# Patient Record
Sex: FEMALE | Race: WHITE | NOT HISPANIC OR LATINO | URBAN - METROPOLITAN AREA
[De-identification: names, ages, dates, MRNs, and addresses within clinical notes are randomized per-mention and may not be internally consistent; named-entity substitution may affect disease eponyms.]

---

## 2017-01-18 ENCOUNTER — COMMUNICATION - HEALTHEAST (OUTPATIENT)
Dept: TELEHEALTH | Facility: CLINIC | Age: 47
End: 2017-01-18

## 2017-01-18 ENCOUNTER — OFFICE VISIT - HEALTHEAST (OUTPATIENT)
Dept: FAMILY MEDICINE | Facility: CLINIC | Age: 47
End: 2017-01-18

## 2017-01-18 DIAGNOSIS — Z13.1 SCREENING FOR DIABETES MELLITUS: ICD-10-CM

## 2017-01-18 DIAGNOSIS — Z13.228 SCREENING FOR ENDOCRINE, NUTRITIONAL, METABOLIC AND IMMUNITY DISORDER: ICD-10-CM

## 2017-01-18 DIAGNOSIS — Z13.228 SCREENING FOR METABOLIC DISORDER: ICD-10-CM

## 2017-01-18 DIAGNOSIS — Z13.29 SCREENING FOR THYROID DISORDER: ICD-10-CM

## 2017-01-18 DIAGNOSIS — Z13.29 SCREENING FOR ENDOCRINE, NUTRITIONAL, METABOLIC AND IMMUNITY DISORDER: ICD-10-CM

## 2017-01-18 DIAGNOSIS — Z13.220 SCREENING, LIPID: ICD-10-CM

## 2017-01-18 DIAGNOSIS — E78.5 HLD (HYPERLIPIDEMIA): ICD-10-CM

## 2017-01-18 DIAGNOSIS — Z13.21 SCREENING FOR ENDOCRINE, NUTRITIONAL, METABOLIC AND IMMUNITY DISORDER: ICD-10-CM

## 2017-01-18 DIAGNOSIS — Z13.0 SCREENING, ANEMIA, DEFICIENCY, IRON: ICD-10-CM

## 2017-01-18 DIAGNOSIS — Z13.0 SCREENING FOR ENDOCRINE, NUTRITIONAL, METABOLIC AND IMMUNITY DISORDER: ICD-10-CM

## 2017-01-18 DIAGNOSIS — Z12.31 ENCOUNTER FOR SCREENING MAMMOGRAM FOR MALIGNANT NEOPLASM OF BREAST: ICD-10-CM

## 2017-01-18 ASSESSMENT — MIFFLIN-ST. JEOR: SCORE: 1465.44

## 2017-01-18 NOTE — ASSESSMENT & PLAN NOTE
Weight reduction recommended.  She  Is considering participating in the Weight Loss Program at [facility].

## 2017-01-19 ENCOUNTER — AMBULATORY - HEALTHEAST (OUTPATIENT)
Dept: LAB | Facility: CLINIC | Age: 47
End: 2017-01-19

## 2017-01-19 DIAGNOSIS — Z13.1 SCREENING FOR DIABETES MELLITUS: ICD-10-CM

## 2017-01-19 DIAGNOSIS — Z13.29 SCREENING FOR THYROID DISORDER: ICD-10-CM

## 2017-01-19 DIAGNOSIS — Z13.228 SCREENING FOR METABOLIC DISORDER: ICD-10-CM

## 2017-01-19 DIAGNOSIS — Z13.0 SCREENING, ANEMIA, DEFICIENCY, IRON: ICD-10-CM

## 2017-01-19 DIAGNOSIS — Z13.220 SCREENING, LIPID: ICD-10-CM

## 2017-01-19 DIAGNOSIS — Z13.228 SCREENING FOR ENDOCRINE, NUTRITIONAL, METABOLIC AND IMMUNITY DISORDER: ICD-10-CM

## 2017-01-19 DIAGNOSIS — Z13.21 SCREENING FOR ENDOCRINE, NUTRITIONAL, METABOLIC AND IMMUNITY DISORDER: ICD-10-CM

## 2017-01-19 DIAGNOSIS — Z13.0 SCREENING FOR ENDOCRINE, NUTRITIONAL, METABOLIC AND IMMUNITY DISORDER: ICD-10-CM

## 2017-01-19 DIAGNOSIS — Z13.29 SCREENING FOR ENDOCRINE, NUTRITIONAL, METABOLIC AND IMMUNITY DISORDER: ICD-10-CM

## 2017-01-19 LAB
CHOLEST SERPL-MCNC: 191 MG/DL
FASTING STATUS PATIENT QL REPORTED: YES
HBA1C MFR BLD: 5.6 % (ref 3.5–6)
HDLC SERPL-MCNC: 52 MG/DL
LDLC SERPL CALC-MCNC: 114 MG/DL
TRIGL SERPL-MCNC: 125 MG/DL

## 2017-01-30 ENCOUNTER — COMMUNICATION - HEALTHEAST (OUTPATIENT)
Dept: FAMILY MEDICINE | Facility: CLINIC | Age: 47
End: 2017-01-30

## 2017-01-30 DIAGNOSIS — E87.8 HYPERCHLOREMIA: ICD-10-CM

## 2021-05-25 ENCOUNTER — RECORDS - HEALTHEAST (OUTPATIENT)
Dept: ADMINISTRATIVE | Facility: CLINIC | Age: 51
End: 2021-05-25

## 2021-05-26 ENCOUNTER — RECORDS - HEALTHEAST (OUTPATIENT)
Dept: ADMINISTRATIVE | Facility: CLINIC | Age: 51
End: 2021-05-26

## 2021-05-29 ENCOUNTER — RECORDS - HEALTHEAST (OUTPATIENT)
Dept: ADMINISTRATIVE | Facility: CLINIC | Age: 51
End: 2021-05-29

## 2021-05-30 VITALS — HEIGHT: 64 IN | BODY MASS INDEX: 32.95 KG/M2 | WEIGHT: 193 LBS

## 2021-06-08 NOTE — PROGRESS NOTES
"Chief Complaint   Patient presents with     Establish Care     Looking to establish care with a primary provider. Only has had OBGYN prior to this.  Concerns: had some abnormal labs done at her OBGYN office a year ago, and wanted to follow up on the results.      HPI  Sarah Orellana is a 46 y.o. female comes in to discuss elevated lipid labs from back in 2015.  She had seen Dr. Borrego about this in the past, but wanted a more proactive approach.  She is interested in becoming as healthy as possible.     She also wants me to order a mammogram.  She says that she will see her gyn next week for a pelvic exam.     History   Smoking Status     Never Smoker   Smokeless Tobacco     Never Used      No current outpatient prescriptions on file.     No current facility-administered medications for this visit.      No Known Allergies  Review of Systems - 10 point ros is negative, except as noted above.      OBJECTIVE:   Visit Vitals     /76     Pulse 76     Resp 16     Ht 5' 4\" (1.626 m)     Wt 193 lb (87.5 kg)     LMP 01/03/2017 (Exact Date)     Breastfeeding No     BMI 33.13 kg/m2     General; healthy.    ASSESSMENT AND PLAN: We spent 20 minutes today in direct patient contact, 100% of the time in consultation concerning medical problems as listed below.  We discussed at length her previous attempts at weight loss and why they may or may not have worked.  I answered her questions including drawing graphs of different weight loss curves and how she may need to approach it in the future to have longer term success.  We discussed the [place] weight loss program at length including what is included and if it may be appropriate for her answering her questions as we went.  Problem List Items Addressed This Visit     HLD (hyperlipidemia)     Weight reduction recommended.  She  Is considering participating in the Weight Loss Program at [facility].          BMI 33.0-33.9,adult      Other Visit Diagnoses     Screening, " lipid    -  Primary    Relevant Orders    Lipid Cascade    Screening for diabetes mellitus        Relevant Orders    Glycosylated Hemoglobin A1c    Screening for thyroid disorder        Relevant Orders    Thyroid [place]    Screening for metabolic disorder        Relevant Orders    Comprehensive Metabolic Panel    Screening, anemia, deficiency, iron        Relevant Orders    HM1(CBC and Differential)    Screening for endocrine, nutritional, metabolic and immunity disorder        Relevant Orders    Vitamin D, Total (25-Hydroxy)    Encounter for screening mammogram for malignant neoplasm of breast        Relevant Orders    Mammo Screening Bilateral

## 2021-06-15 PROBLEM — E87.8 HYPERCHLOREMIA: Status: ACTIVE | Noted: 2017-01-30

## 2021-07-21 ENCOUNTER — RECORDS - HEALTHEAST (OUTPATIENT)
Dept: ADMINISTRATIVE | Facility: CLINIC | Age: 51
End: 2021-07-21